# Patient Record
Sex: MALE | Race: ASIAN | NOT HISPANIC OR LATINO | URBAN - METROPOLITAN AREA
[De-identification: names, ages, dates, MRNs, and addresses within clinical notes are randomized per-mention and may not be internally consistent; named-entity substitution may affect disease eponyms.]

---

## 2020-08-14 ENCOUNTER — INPATIENT (INPATIENT)
Facility: HOSPITAL | Age: 55
LOS: 0 days | Discharge: HOME | End: 2020-08-15
Attending: INTERNAL MEDICINE | Admitting: INTERNAL MEDICINE
Payer: COMMERCIAL

## 2020-08-14 VITALS
TEMPERATURE: 98 F | RESPIRATION RATE: 16 BRPM | HEART RATE: 72 BPM | DIASTOLIC BLOOD PRESSURE: 89 MMHG | OXYGEN SATURATION: 100 % | SYSTOLIC BLOOD PRESSURE: 180 MMHG

## 2020-08-14 DIAGNOSIS — Z29.9 ENCOUNTER FOR PROPHYLACTIC MEASURES, UNSPECIFIED: ICD-10-CM

## 2020-08-14 DIAGNOSIS — D72.829 ELEVATED WHITE BLOOD CELL COUNT, UNSPECIFIED: ICD-10-CM

## 2020-08-14 DIAGNOSIS — E11.9 TYPE 2 DIABETES MELLITUS WITHOUT COMPLICATIONS: ICD-10-CM

## 2020-08-14 DIAGNOSIS — R07.9 CHEST PAIN, UNSPECIFIED: ICD-10-CM

## 2020-08-14 DIAGNOSIS — I10 ESSENTIAL (PRIMARY) HYPERTENSION: ICD-10-CM

## 2020-08-14 DIAGNOSIS — E78.00 PURE HYPERCHOLESTEROLEMIA, UNSPECIFIED: ICD-10-CM

## 2020-08-14 PROBLEM — Z00.00 ENCOUNTER FOR PREVENTIVE HEALTH EXAMINATION: Status: ACTIVE | Noted: 2020-08-14

## 2020-08-14 LAB
ALBUMIN SERPL ELPH-MCNC: 4.6 G/DL — SIGNIFICANT CHANGE UP (ref 3.3–5)
ALP SERPL-CCNC: 78 U/L — SIGNIFICANT CHANGE UP (ref 40–120)
ALT FLD-CCNC: 20 U/L — SIGNIFICANT CHANGE UP (ref 4–41)
ANION GAP SERPL CALC-SCNC: 16 MMO/L — HIGH (ref 7–14)
AST SERPL-CCNC: 20 U/L — SIGNIFICANT CHANGE UP (ref 4–40)
BASOPHILS # BLD AUTO: 0.03 K/UL — SIGNIFICANT CHANGE UP (ref 0–0.2)
BASOPHILS NFR BLD AUTO: 0.2 % — SIGNIFICANT CHANGE UP (ref 0–2)
BILIRUB SERPL-MCNC: 0.5 MG/DL — SIGNIFICANT CHANGE UP (ref 0.2–1.2)
BUN SERPL-MCNC: 16 MG/DL — SIGNIFICANT CHANGE UP (ref 7–23)
CALCIUM SERPL-MCNC: 9.7 MG/DL — SIGNIFICANT CHANGE UP (ref 8.4–10.5)
CHLORIDE SERPL-SCNC: 94 MMOL/L — LOW (ref 98–107)
CHOLEST SERPL-MCNC: 137 MG/DL — SIGNIFICANT CHANGE UP (ref 120–199)
CO2 SERPL-SCNC: 24 MMOL/L — SIGNIFICANT CHANGE UP (ref 22–31)
CREAT SERPL-MCNC: 0.86 MG/DL — SIGNIFICANT CHANGE UP (ref 0.5–1.3)
EOSINOPHIL # BLD AUTO: 0 K/UL — SIGNIFICANT CHANGE UP (ref 0–0.5)
EOSINOPHIL NFR BLD AUTO: 0 % — SIGNIFICANT CHANGE UP (ref 0–6)
GLUCOSE BLDC GLUCOMTR-MCNC: 104 MG/DL — HIGH (ref 70–99)
GLUCOSE BLDC GLUCOMTR-MCNC: 134 MG/DL — HIGH (ref 70–99)
GLUCOSE SERPL-MCNC: 164 MG/DL — HIGH (ref 70–99)
HBA1C BLD-MCNC: 6.1 % — HIGH (ref 4–5.6)
HCT VFR BLD CALC: 43.3 % — SIGNIFICANT CHANGE UP (ref 39–50)
HDLC SERPL-MCNC: 60 MG/DL — HIGH (ref 35–55)
HGB BLD-MCNC: 14 G/DL — SIGNIFICANT CHANGE UP (ref 13–17)
IMM GRANULOCYTES NFR BLD AUTO: 0.5 % — SIGNIFICANT CHANGE UP (ref 0–1.5)
LIDOCAIN IGE QN: 20.3 U/L — SIGNIFICANT CHANGE UP (ref 7–60)
LIPID PNL WITH DIRECT LDL SERPL: 75 MG/DL — SIGNIFICANT CHANGE UP
LYMPHOCYTES # BLD AUTO: 0.49 K/UL — LOW (ref 1–3.3)
LYMPHOCYTES # BLD AUTO: 3.4 % — LOW (ref 13–44)
MAGNESIUM SERPL-MCNC: 1.7 MG/DL — SIGNIFICANT CHANGE UP (ref 1.6–2.6)
MCHC RBC-ENTMCNC: 25.4 PG — LOW (ref 27–34)
MCHC RBC-ENTMCNC: 32.3 % — SIGNIFICANT CHANGE UP (ref 32–36)
MCV RBC AUTO: 78.4 FL — LOW (ref 80–100)
MONOCYTES # BLD AUTO: 0.47 K/UL — SIGNIFICANT CHANGE UP (ref 0–0.9)
MONOCYTES NFR BLD AUTO: 3.3 % — SIGNIFICANT CHANGE UP (ref 2–14)
NEUTROPHILS # BLD AUTO: 13.31 K/UL — HIGH (ref 1.8–7.4)
NEUTROPHILS NFR BLD AUTO: 92.6 % — HIGH (ref 43–77)
NRBC # FLD: 0 K/UL — SIGNIFICANT CHANGE UP (ref 0–0)
PHOSPHATE SERPL-MCNC: 2.8 MG/DL — SIGNIFICANT CHANGE UP (ref 2.5–4.5)
PLATELET # BLD AUTO: 178 K/UL — SIGNIFICANT CHANGE UP (ref 150–400)
PMV BLD: 11.2 FL — SIGNIFICANT CHANGE UP (ref 7–13)
POTASSIUM SERPL-MCNC: 4.1 MMOL/L — SIGNIFICANT CHANGE UP (ref 3.5–5.3)
POTASSIUM SERPL-SCNC: 4.1 MMOL/L — SIGNIFICANT CHANGE UP (ref 3.5–5.3)
PROT SERPL-MCNC: 7.4 G/DL — SIGNIFICANT CHANGE UP (ref 6–8.3)
RBC # BLD: 5.52 M/UL — SIGNIFICANT CHANGE UP (ref 4.2–5.8)
RBC # FLD: 12.8 % — SIGNIFICANT CHANGE UP (ref 10.3–14.5)
SARS-COV-2 RNA SPEC QL NAA+PROBE: SIGNIFICANT CHANGE UP
SODIUM SERPL-SCNC: 134 MMOL/L — LOW (ref 135–145)
TRIGL SERPL-MCNC: 29 MG/DL — SIGNIFICANT CHANGE UP (ref 10–149)
TROPONIN T, HIGH SENSITIVITY: 12 NG/L — SIGNIFICANT CHANGE UP (ref ?–14)
TROPONIN T, HIGH SENSITIVITY: 8 NG/L — SIGNIFICANT CHANGE UP (ref ?–14)
WBC # BLD: 14.37 K/UL — HIGH (ref 3.8–10.5)
WBC # FLD AUTO: 14.37 K/UL — HIGH (ref 3.8–10.5)

## 2020-08-14 PROCEDURE — 99223 1ST HOSP IP/OBS HIGH 75: CPT

## 2020-08-14 PROCEDURE — 71045 X-RAY EXAM CHEST 1 VIEW: CPT | Mod: 26

## 2020-08-14 PROCEDURE — 99285 EMERGENCY DEPT VISIT HI MDM: CPT

## 2020-08-14 RX ORDER — SIMVASTATIN 20 MG/1
1 TABLET, FILM COATED ORAL
Qty: 0 | Refills: 0 | DISCHARGE

## 2020-08-14 RX ORDER — MILK THISTLE 150 MG
0 CAPSULE ORAL
Qty: 0 | Refills: 0 | DISCHARGE

## 2020-08-14 RX ORDER — DEXTROSE 50 % IN WATER 50 %
15 SYRINGE (ML) INTRAVENOUS ONCE
Refills: 0 | Status: DISCONTINUED | OUTPATIENT
Start: 2020-08-14 | End: 2020-08-15

## 2020-08-14 RX ORDER — PANTOPRAZOLE SODIUM 20 MG/1
80 TABLET, DELAYED RELEASE ORAL ONCE
Refills: 0 | Status: COMPLETED | OUTPATIENT
Start: 2020-08-14 | End: 2020-08-14

## 2020-08-14 RX ORDER — DEXTROSE 50 % IN WATER 50 %
12.5 SYRINGE (ML) INTRAVENOUS ONCE
Refills: 0 | Status: DISCONTINUED | OUTPATIENT
Start: 2020-08-14 | End: 2020-08-15

## 2020-08-14 RX ORDER — FAMOTIDINE 10 MG/ML
20 INJECTION INTRAVENOUS EVERY 12 HOURS
Refills: 0 | Status: DISCONTINUED | OUTPATIENT
Start: 2020-08-14 | End: 2020-08-15

## 2020-08-14 RX ORDER — LIDOCAINE 4 G/100G
15 CREAM TOPICAL ONCE
Refills: 0 | Status: COMPLETED | OUTPATIENT
Start: 2020-08-14 | End: 2020-08-14

## 2020-08-14 RX ORDER — INSULIN LISPRO 100/ML
VIAL (ML) SUBCUTANEOUS
Refills: 0 | Status: DISCONTINUED | OUTPATIENT
Start: 2020-08-14 | End: 2020-08-15

## 2020-08-14 RX ORDER — ONDANSETRON 8 MG/1
4 TABLET, FILM COATED ORAL ONCE
Refills: 0 | Status: COMPLETED | OUTPATIENT
Start: 2020-08-14 | End: 2020-08-14

## 2020-08-14 RX ORDER — ONDANSETRON 8 MG/1
4 TABLET, FILM COATED ORAL EVERY 6 HOURS
Refills: 0 | Status: DISCONTINUED | OUTPATIENT
Start: 2020-08-14 | End: 2020-08-15

## 2020-08-14 RX ORDER — PANTOPRAZOLE SODIUM 20 MG/1
40 TABLET, DELAYED RELEASE ORAL
Refills: 0 | Status: DISCONTINUED | OUTPATIENT
Start: 2020-08-14 | End: 2020-08-15

## 2020-08-14 RX ORDER — ASPIRIN/CALCIUM CARB/MAGNESIUM 324 MG
162 TABLET ORAL DAILY
Refills: 0 | Status: DISCONTINUED | OUTPATIENT
Start: 2020-08-14 | End: 2020-08-15

## 2020-08-14 RX ORDER — GLUCAGON INJECTION, SOLUTION 0.5 MG/.1ML
1 INJECTION, SOLUTION SUBCUTANEOUS ONCE
Refills: 0 | Status: DISCONTINUED | OUTPATIENT
Start: 2020-08-14 | End: 2020-08-15

## 2020-08-14 RX ORDER — CHOLECALCIFEROL (VITAMIN D3) 125 MCG
0 CAPSULE ORAL
Qty: 0 | Refills: 0 | DISCHARGE

## 2020-08-14 RX ORDER — FAMOTIDINE 10 MG/ML
20 INJECTION INTRAVENOUS ONCE
Refills: 0 | Status: COMPLETED | OUTPATIENT
Start: 2020-08-14 | End: 2020-08-14

## 2020-08-14 RX ORDER — OMEGA-3 ACID ETHYL ESTERS 1 G
0 CAPSULE ORAL
Qty: 0 | Refills: 0 | DISCHARGE

## 2020-08-14 RX ORDER — SIMVASTATIN 20 MG/1
10 TABLET, FILM COATED ORAL AT BEDTIME
Refills: 0 | Status: DISCONTINUED | OUTPATIENT
Start: 2020-08-14 | End: 2020-08-15

## 2020-08-14 RX ORDER — SODIUM CHLORIDE 9 MG/ML
1000 INJECTION, SOLUTION INTRAVENOUS
Refills: 0 | Status: DISCONTINUED | OUTPATIENT
Start: 2020-08-14 | End: 2020-08-15

## 2020-08-14 RX ADMIN — FAMOTIDINE 20 MILLIGRAM(S): 10 INJECTION INTRAVENOUS at 12:43

## 2020-08-14 RX ADMIN — Medication 30 MILLILITER(S): at 12:43

## 2020-08-14 RX ADMIN — Medication 162 MILLIGRAM(S): at 16:23

## 2020-08-14 RX ADMIN — ONDANSETRON 4 MILLIGRAM(S): 8 TABLET, FILM COATED ORAL at 12:47

## 2020-08-14 RX ADMIN — SIMVASTATIN 10 MILLIGRAM(S): 20 TABLET, FILM COATED ORAL at 22:33

## 2020-08-14 RX ADMIN — FAMOTIDINE 20 MILLIGRAM(S): 10 INJECTION INTRAVENOUS at 18:14

## 2020-08-14 RX ADMIN — PANTOPRAZOLE SODIUM 80 MILLIGRAM(S): 20 TABLET, DELAYED RELEASE ORAL at 12:43

## 2020-08-14 RX ADMIN — LIDOCAINE 15 MILLILITER(S): 4 CREAM TOPICAL at 12:47

## 2020-08-14 NOTE — ED PROVIDER NOTE - ATTENDING CONTRIBUTION TO CARE
Gong: I have seen and examined the patient face to face, have reviewed and addended the HPI, PE and a/p as necessary.     56 yo M with HTN PUD, a/w chest pain, nausea and vomiting yesterday after ETOH intake.  Pt reports drinking beers and a shot of vodka yesterday developed multiple episodes of vomiting, described as dark.  Reports occasional drinking.  Reports associated chest pain since described as diffuse burning and acidit taste.  Denies exertional chest pain, exertional shortness of breath, fevers, chills abdominal pain lightheadedness, diaphoresis, LE swelling.      GEN - NAD; well appearing; A+O x3; non-toxic appearing; CARD -s1s2, RRR, no M,G,R; PULM - CTA b/l, symmetric breath sounds; ABD -  +BS, ND, NT, soft, no guarding, no rebound, no masses; BACK - no CVA tenderness, Normal  spine; EXT - symmetric pulses, 2+ dp, capillary refill < 2 seconds, no cyanosis, no edema; NEURO - no focal neuro deficits, no slurred speech    EKG NSR 69, normal axis, no ST-changes     56 yo M with HTN PUD, a/w chest pain, nausea and vomiting yesterday after ETOH intake possibly alcoholic gastritis vs acute on chronic PUD vs less likely ACS given history, with nl EKG.  Symptomatic care, follow up cbc, cmp, trops, re-eval

## 2020-08-14 NOTE — H&P ADULT - NSICDXPASTMEDICALHX_GEN_ALL_CORE_FT
PAST MEDICAL HISTORY:  Diabetes Mellitus borderline --just diagnosed 2 weeks ago    Hypercholesterolemia     Hypertension

## 2020-08-14 NOTE — ED ADULT TRIAGE NOTE - CHIEF COMPLAINT QUOTE
pt amb to triage c/o CP since last night non radiating, described as burning associated w/ N/V, pepto @ home w/ no relief, burping on presentation, denies past cardiac history, states ETOH last night, Hx ulcer

## 2020-08-14 NOTE — CONSULT NOTE ADULT - SUBJECTIVE AND OBJECTIVE BOX
DATE OF SERVICE:Patient was seen,examined and evaluated on-08/14/2020    CHIEF COMPLAINT:Chest pain    HPI:55F w/PMH of HTN, HLD, T2DM, and PUD/GERD presenting with chest pain and nausea with vomitting since last night. Pt reports drinking a few beers and a shot of vodka yesterday around 7PM. When he went to sleep he felt reflux symptoms and forced himself to vomit.   Since then he reports burning chest pain, intermittent nausea, no further emesis.   Reports having EGD with Dr. Hunt, but unsure when.   Reports having normal stress test, echo, carotid US within the last year w/Dr Josefa Diaz     In ED, pt given IV Protonix, IV Pepcid, Viscous lidocaine, Maalox suspension, IV Zofran which relieved some symptoms. (14 Aug 2020 15:30)      PAST MEDICAL & SURGICAL HISTORY:  Hypertension  Diabetes Mellitus: borderline --just diagnosed 2 weeks ago  Hypercholesterolemia  No significant past surgical history      MEDICATIONS  (STANDING):  aspirin enteric coated 162 milliGRAM(s) Oral daily  dextrose 5%. 1000 milliLiter(s) (50 mL/Hr) IV Continuous <Continuous>  dextrose 50% Injectable 12.5 Gram(s) IV Push once  famotidine Injectable 20 milliGRAM(s) IV Push every 12 hours  insulin lispro (HumaLOG) corrective regimen sliding scale   SubCutaneous Before meals and at bedtime  pantoprazole    Tablet 40 milliGRAM(s) Oral before breakfast  simvastatin 10 milliGRAM(s) Oral at bedtime    MEDICATIONS  (PRN):  aluminum hydroxide/magnesium hydroxide/simethicone Suspension 30 milliLiter(s) Oral every 6 hours PRN Dyspepsia  dextrose 40% Gel 15 Gram(s) Oral once PRN Blood Glucose LESS THAN 70 milliGRAM(s)/deciliter  glucagon  Injectable 1 milliGRAM(s) IntraMuscular once PRN Glucose LESS THAN 70 milligrams/deciliter  ondansetron Injectable 4 milliGRAM(s) IV Push every 6 hours PRN Nausea and/or Vomiting      FAMILY HISTORY:  Family history of lung cancer  No family history of premature coronary artery disease or sudden cardiac death    SOCIAL HISTORY:  Smoking-Non Smoker  Alcohol-Occaisonal binge  Ilicit Drug use-Denies    REVIEW OF SYSTEMS:  Constitutional: [ ] fever, [ ]weight loss, [ ]fatigue Activity [ ] Bedbound,[ ] Ambulates [ ] Unassisted[ ] Cane/Walker [ ] Assistence.  Eyes: [ ] visual changes  Respiratory: [ ]shortness of breath;  [ ] cough, [ ]wheezing, [ ]chills, [ ]hemoptysis  Cardiovascular: [ ] chest pain, [ ]palpitations, [ ]dizziness,  [ ]leg swelling[ ]orthopnea [ ]PND  Gastrointestinal: [ ] abdominal pain, [ ]nausea, [ ]vomiting,  [ ]diarrhea,[ ]constipation  Genitourinary: [ ] dysuria, [ ] hematuria  Neurologic: [ ] headaches [ ] tremors[ ] weakness  Skin: [ ] itching, [ ]burning, [ ] rashes  Endocrine: [ ] heat or cold intolerance  Musculoskeletal: [ ] joint pain or swelling; [ ] muscle, back, or extremity pain  Psychiatric: [ ] depression, [ ]anxiety, [ ]mood swings, or [ ]difficulty sleeping  Hematologic: [ ] easy bruising, [ ] bleeding gums       [ x] All others negative	  [ ] Unable to obtain    Vital Signs Last 24 Hrs    T(C): 36.8 (08-14-20 @ 13:36), Max: 36.8 (08-14-20 @ 13:36)  	T(F): 98.3 (08-14-20 @ 13:36), Max: 98.3 (08-14-20 @ 13:36)  	HR: 70 (08-14-20 @ 13:36) (70 - 72)  	BP: 178/99 (08-14-20 @ 13:36) (178/99 - 180/89)  	RR: 16 (08-14-20 @ 13:36) (16 - 16)  	SpO2: 100% (08-14-20 @ 13:36) (100% - 100%)      PHYSICAL EXAM:  General: No acute distress BMI-  HEENT: EOMI, PERRL[ ] Icteric  Neck: Supple, No JVD  Lungs: Equal air entry bilaterally; [ ] Rales [ ] Rhonchi [ ] Wheezing  Heart: Regular rate and rhythm;[x ] Murmurs-  2 /6 [x ] Systolic [ ] Diastolic [ ] Radiation,No rubs, or gallops  Abdomen: Nontender, bowel sounds present  Extremities: No clubbing, cyanosis, or edema[ ] Calf tenderness  Nervous system:  Alert & Oriented X3, no focal deficits  Psychiatric: Normal affect  Skin: No rashes or lesions      LABS:  08-14    134<L>  |  94<L>  |  16  ----------------------------<  164<H>  4.1   |  24  |  0.86    Ca    9.7      14 Aug 2020 12:20  Phos  2.8     08-14  Mg     1.7     08-14    TPro  7.4  /  Alb  4.6  /  TBili  0.5  /  DBili  x   /  AST  20  /  ALT  20  /  AlkPhos  78  08-14    Creatinine Trend: 0.86<--                        14.0   14.37 )-----------( 178      ( 14 Aug 2020 12:20 )             43.3         Lipid Panel: Cholesterol, Serum 137  Direct LDL 75  HDL Cholesterol, Serum 60  Triglycerides, Serum 29    Troponin T, High Sensitivity (08.14.20 @ 14:00)    Troponin T, High Sensitivity: 12 <---8  ng/L      RADIOLOGY:CXR-IMPRESSION:  	No subdiaphragmatic free air or abnormally dilated bowel loops in the visualized upper abdomen.  	Clear lungs. No pleural effusions pneumothorax or pneumomediastinum.  	Cardiac and mediastinal silhouettes within normal limits.  	Trachea midline    Unremarkable osseous structures.    ECG [my interpretation]:Sinus Rhythm at 69 BPM No ST T wave abnormalities    TELEMETRY:Sinus Rhythm

## 2020-08-14 NOTE — ED ADULT NURSE NOTE - OBJECTIVE STATEMENT
Pt amb to triage c/o CP since last night non radiating, described as burning associated w/ N/V, pepto @ home w/ no relief, burping on presentation, denies past cardiac history, states ETOH last night,  Patient to room 27 alert and oriented times four. Pt evaluated by MD for above complaints. IV lock placed to right antecubital 20 gauge and labs drawn and sent. Waiting for results and disposition.  TOSHIA Gay

## 2020-08-14 NOTE — ED PROVIDER NOTE - OBJECTIVE STATEMENT
56 yo M w/ PMHx HTN, PUD presents with chest pain, belching, n/v since last night. Pt states he had multiple beers and a shot of Ciroc yesterday evening. He subsequently had multiple episodes of vomiting. He states contents was dark. He has had this occur before in setting of binge drinking. Denies drinking every day. Since then, pt has had diffuse burning chest pain as well as belching with a subsequent acidic taste in his mouth. Denies CP, SOB, fevers, chills, abdominal pain, lightheadedness, diaphoresis.

## 2020-08-14 NOTE — ED PROVIDER NOTE - PROGRESS NOTE DETAILS
Gave Gi cocktail with some improvement but continues to have chest pain. EKG NSR. Repeat trop 8>12. Will admit to tele doc

## 2020-08-14 NOTE — CONSULT NOTE ADULT - ASSESSMENT
55F w/PMH of HTN, HLD, T2DM, and PUD/GERD presenting with chest pain and n/v since last night. Admitted to telemetry     Problem/Plan - 1:  ·  Problem: Chest pain.  Plan:Concern for ACS  -ECG normal with negative troponins  -Likely GI related  -PPI   -Observe 24 hrs   -TTE possible stress echo      Problem/Plan - 2:  ·  Problem: Hypertension.  Plan: Hypertensive in ED, likely due to pain. Not on meds outpt   -Repeat BP, can give Hydral if needed for BP >180.   -Monitor BP    Problem/Plan - 3:  ·  Problem: Diabetes Mellitus.  Plan: f/u A1c   LDSS and FS.   -POCT  Blood Glucose (08.14.20 @ 17:55)    POCT Blood Glucose.: 134 mg/dL        Problem/Plan - 4:  ·  Problem: Hypercholesterolemia.  Plan: f/u lipid panel   C/w simvastatin.       Problem/Plan - 5:  ·  Problem: Leukocytosis.  Plan:No overt signs of infection  -Afebrile  -Repeat WBC in AM      Problem/Plan - 6:  Problem: Prophylactic measure. Plan: OOB, ambulate   CCD, DASH/no caffeine.

## 2020-08-14 NOTE — H&P ADULT - HISTORY OF PRESENT ILLNESS
55F w/PMH of HTN, HLD, DMII, and PUD presenting with chest pain and n/v since last night. 55F w/PMH of HTN, HLD, DMII, and PUD/GERD presenting with chest pain and n/v since last night. Pt reports drinking a few beers and a shot of vodka yesterday around 7PM. When he went to sleep he felt reflux symptoms and forced himself to vomit. Since then he reports burning chest pain, intermittent nausea, no further emesis.   Reports having EGD with Dr. Hunt, but unsure when. Reports having normal stress test, echo, carotid US within the last year w/Dr Josefa Diaz     In ED, pt given IV Protonix, IV Pepcid, Viscous lidocaine, Maalox suspension, IV Zofran which relieved some symptoms.

## 2020-08-14 NOTE — H&P ADULT - NSHPSOCIALHISTORY_GEN_ALL_CORE
Lives with family, works in fashion industry  Denies smoking or illicit drug use  Reports binge drinking once in a while

## 2020-08-14 NOTE — H&P ADULT - PROBLEM SELECTOR PLAN 2
Likely reactive in setting of emesis. NO signs of infection  -Monitor CBC  -Hold off on abx at this time

## 2020-08-14 NOTE — H&P ADULT - ASSESSMENT
55F w/PMH of HTN, HLD, DMII, and PUD/GERD presenting with chest pain and n/v since last night admitted to cardiac telemetry for ACS workup.

## 2020-08-14 NOTE — ED PROVIDER NOTE - CLINICAL SUMMARY MEDICAL DECISION MAKING FREE TEXT BOX
56 yo M w/ PMHx HTN, PUD presents with chest pain, belching, n/v since last night after en episode of binge drinking. Will obtain cbc, cmp, trop, ekg, CXR to r/o Carmen Govea tear. Will also GI cocktail, protonix 80 mg IV x1 given hx of PUD and dark vomitus, suspect gastritis vs PUD vs ACS.

## 2020-08-14 NOTE — H&P ADULT - PROBLEM SELECTOR PLAN 3
Hypertensive in ED, likely due to pain. Not on meds outpt   -Repeat BP, can give Hydral if needed for BP >180

## 2020-08-14 NOTE — H&P ADULT - NSHPLABSRESULTS_GEN_ALL_CORE
.  LABS:                         14.0   14.37 )-----------( 178      ( 14 Aug 2020 12:20 )             43.3     08-14    134<L>  |  94<L>  |  16  ----------------------------<  164<H>  4.1   |  24  |  0.86    Ca    9.7      14 Aug 2020 12:20  Phos  2.8     08-14  Mg     1.7     08-14    TPro  7.4  /  Alb  4.6  /  TBili  0.5  /  DBili  x   /  AST  20  /  ALT  20  /  AlkPhos  78  08-14                  RADIOLOGY, EKG & ADDITIONAL TESTS: Reviewed.   EKG:       < from: Xray Chest 1 View AP/PA (08.14.20 @ 13:09) >  IMPRESSION:  No subdiaphragmatic free air or abnormally dilated bowel loops in the visualized upper abdomen.  Clear lungs. No pleural effusions pneumothorax or pneumomediastinum.  Cardiac and mediastinal silhouettes within normal limits.  Trachea midline.  Unremarkable osseous structures.  DREW MONTANA M.D., ATTENDING RADIOLOGIST  This document has been electronically signed. Aug 14 2020  2:13PM .  LABS:                         14.0   14.37 )-----------( 178      ( 14 Aug 2020 12:20 )             43.3     08-14    134<L>  |  94<L>  |  16  ----------------------------<  164<H>  4.1   |  24  |  0.86    Ca    9.7      14 Aug 2020 12:20  Phos  2.8     08-14  Mg     1.7     08-14    TPro  7.4  /  Alb  4.6  /  TBili  0.5  /  DBili  x   /  AST  20  /  ALT  20  /  AlkPhos  78  08-14                  RADIOLOGY, EKG & ADDITIONAL TESTS: Reviewed.   EKG: NSR at 69, normal axis, no ST-changes on my review       < from: Xray Chest 1 View AP/PA (08.14.20 @ 13:09) >  IMPRESSION:  No subdiaphragmatic free air or abnormally dilated bowel loops in the visualized upper abdomen.  Clear lungs. No pleural effusions pneumothorax or pneumomediastinum.  Cardiac and mediastinal silhouettes within normal limits.  Trachea midline.  Unremarkable osseous structures.  DREW MONTANA M.D., ATTENDING RADIOLOGIST  This document has been electronically signed. Aug 14 2020  2:13PM

## 2020-08-14 NOTE — CONSULT NOTE ADULT - ATTENDING COMMENTS
Patient was seen and examined by me on 08/14/2020,interim events noted,labs and radiology studies reviewed.  Tu Reynolds MD,FACC.  [address] [place] [address].  [facility].  [phone]

## 2020-08-14 NOTE — H&P ADULT - NSHPPHYSICALEXAM_GEN_ALL_CORE
.  VITAL SIGNS:  T(C): 36.8 (08-14-20 @ 13:36), Max: 36.8 (08-14-20 @ 13:36)  T(F): 98.3 (08-14-20 @ 13:36), Max: 98.3 (08-14-20 @ 13:36)  HR: 70 (08-14-20 @ 13:36) (70 - 72)  BP: 178/99 (08-14-20 @ 13:36) (178/99 - 180/89)  BP(mean): --  RR: 16 (08-14-20 @ 13:36) (16 - 16)  SpO2: 100% (08-14-20 @ 13:36) (100% - 100%)  Wt(kg): --    PHYSICAL EXAM:    Constitutional: WDWN resting comfortably in bed; NAD  Head: NC/AT  Eyes: PERRL, EOMI, clear conjunctiva  ENT: no nasal discharge; uvula midline, no oropharyngeal erythema or exudates; MMM  Neck: supple; no JVD or thyromegaly  Respiratory: CTA B/L; no W/R/R, no retractions  Cardiac: +S1/S2; RRR; no M/R/G; PMI non-displaced  Gastrointestinal: soft, NT/ND; no rebound or guarding; +BSx4  Genitourinary: normal external genitalia  Back: spine midline, no bony tenderness or step-offs; no CVAT B/L  Extremities: WWP, no clubbing or cyanosis; no peripheral edema  Musculoskeletal: NROM x4; no joint swelling, tenderness or erythema  Vascular: 2+ radial, femoral, DP/PT pulses B/L  Dermatologic: skin warm, dry and intact; no rashes, wounds, or scars  Lymphatic: no submandibular or cervical LAD  Neurologic: AAOx3; CNII-XII grossly intact; no focal deficits  Psychiatric: affect and characteristics of appearance, verbalizations, behaviors are appropriate .  VITAL SIGNS:  T(C): 36.8 (08-14-20 @ 13:36), Max: 36.8 (08-14-20 @ 13:36)  T(F): 98.3 (08-14-20 @ 13:36), Max: 98.3 (08-14-20 @ 13:36)  HR: 70 (08-14-20 @ 13:36) (70 - 72)  BP: 178/99 (08-14-20 @ 13:36) (178/99 - 180/89)  BP(mean): --  RR: 16 (08-14-20 @ 13:36) (16 - 16)  SpO2: 100% (08-14-20 @ 13:36) (100% - 100%)  Wt(kg): --    PHYSICAL EXAM:    Constitutional: WDWN resting comfortably in stretcher; NAD  Head: NC/AT  Eyes: PERRL, EOMI, clear conjunctiva  ENT: no nasal discharge; MMM  Neck: supple  Respiratory: CTA B/L; no W/R/R, no retractions  Cardiac: +S1/S2; RRR; no M/R/G  Gastrointestinal: soft, NT/ND; no rebound or guarding; +BSx4  Extremities: WWP, no clubbing or cyanosis; no peripheral edema  Musculoskeletal: NROM x4; no joint swelling, tenderness or erythema  Dermatologic: skin warm, dry and intact; no rashes, wounds, or scars  Neurologic: AAOx3; CNII-XII grossly intact; no focal deficits  Psychiatric: affect and characteristics of appearance, verbalizations, behaviors are appropriate

## 2020-08-15 ENCOUNTER — TRANSCRIPTION ENCOUNTER (OUTPATIENT)
Age: 55
End: 2020-08-15

## 2020-08-15 VITALS
TEMPERATURE: 99 F | HEART RATE: 98 BPM | DIASTOLIC BLOOD PRESSURE: 90 MMHG | SYSTOLIC BLOOD PRESSURE: 138 MMHG | RESPIRATION RATE: 17 BRPM | OXYGEN SATURATION: 98 %

## 2020-08-15 LAB
ALBUMIN SERPL ELPH-MCNC: 4.4 G/DL — SIGNIFICANT CHANGE UP (ref 3.3–5)
ALP SERPL-CCNC: 71 U/L — SIGNIFICANT CHANGE UP (ref 40–120)
ALT FLD-CCNC: 13 U/L — SIGNIFICANT CHANGE UP (ref 4–41)
ANION GAP SERPL CALC-SCNC: 14 MMO/L — SIGNIFICANT CHANGE UP (ref 7–14)
AST SERPL-CCNC: 17 U/L — SIGNIFICANT CHANGE UP (ref 4–40)
BASOPHILS # BLD AUTO: 0.03 K/UL — SIGNIFICANT CHANGE UP (ref 0–0.2)
BASOPHILS NFR BLD AUTO: 0.2 % — SIGNIFICANT CHANGE UP (ref 0–2)
BILIRUB SERPL-MCNC: 0.8 MG/DL — SIGNIFICANT CHANGE UP (ref 0.2–1.2)
BUN SERPL-MCNC: 13 MG/DL — SIGNIFICANT CHANGE UP (ref 7–23)
CALCIUM SERPL-MCNC: 9.6 MG/DL — SIGNIFICANT CHANGE UP (ref 8.4–10.5)
CHLORIDE SERPL-SCNC: 102 MMOL/L — SIGNIFICANT CHANGE UP (ref 98–107)
CO2 SERPL-SCNC: 21 MMOL/L — LOW (ref 22–31)
CREAT SERPL-MCNC: 0.97 MG/DL — SIGNIFICANT CHANGE UP (ref 0.5–1.3)
EOSINOPHIL # BLD AUTO: 0.05 K/UL — SIGNIFICANT CHANGE UP (ref 0–0.5)
EOSINOPHIL NFR BLD AUTO: 0.4 % — SIGNIFICANT CHANGE UP (ref 0–6)
GLUCOSE BLDC GLUCOMTR-MCNC: 132 MG/DL — HIGH (ref 70–99)
GLUCOSE SERPL-MCNC: 117 MG/DL — HIGH (ref 70–99)
HCT VFR BLD CALC: 44.5 % — SIGNIFICANT CHANGE UP (ref 39–50)
HCV AB S/CO SERPL IA: 0.12 S/CO — SIGNIFICANT CHANGE UP (ref 0–0.99)
HCV AB SERPL-IMP: SIGNIFICANT CHANGE UP
HGB BLD-MCNC: 14 G/DL — SIGNIFICANT CHANGE UP (ref 13–17)
IMM GRANULOCYTES NFR BLD AUTO: 0.4 % — SIGNIFICANT CHANGE UP (ref 0–1.5)
LYMPHOCYTES # BLD AUTO: 0.99 K/UL — LOW (ref 1–3.3)
LYMPHOCYTES # BLD AUTO: 8 % — LOW (ref 13–44)
MAGNESIUM SERPL-MCNC: 2.3 MG/DL — SIGNIFICANT CHANGE UP (ref 1.6–2.6)
MCHC RBC-ENTMCNC: 24.7 PG — LOW (ref 27–34)
MCHC RBC-ENTMCNC: 31.5 % — LOW (ref 32–36)
MCV RBC AUTO: 78.6 FL — LOW (ref 80–100)
MONOCYTES # BLD AUTO: 1.02 K/UL — HIGH (ref 0–0.9)
MONOCYTES NFR BLD AUTO: 8.2 % — SIGNIFICANT CHANGE UP (ref 2–14)
NEUTROPHILS # BLD AUTO: 10.24 K/UL — HIGH (ref 1.8–7.4)
NEUTROPHILS NFR BLD AUTO: 82.8 % — HIGH (ref 43–77)
NRBC # FLD: 0 K/UL — SIGNIFICANT CHANGE UP (ref 0–0)
PHOSPHATE SERPL-MCNC: 2.6 MG/DL — SIGNIFICANT CHANGE UP (ref 2.5–4.5)
PLATELET # BLD AUTO: 201 K/UL — SIGNIFICANT CHANGE UP (ref 150–400)
PMV BLD: 11 FL — SIGNIFICANT CHANGE UP (ref 7–13)
POTASSIUM SERPL-MCNC: 4.3 MMOL/L — SIGNIFICANT CHANGE UP (ref 3.5–5.3)
POTASSIUM SERPL-SCNC: 4.3 MMOL/L — SIGNIFICANT CHANGE UP (ref 3.5–5.3)
PROT SERPL-MCNC: 7.3 G/DL — SIGNIFICANT CHANGE UP (ref 6–8.3)
RBC # BLD: 5.66 M/UL — SIGNIFICANT CHANGE UP (ref 4.2–5.8)
RBC # FLD: 13 % — SIGNIFICANT CHANGE UP (ref 10.3–14.5)
SODIUM SERPL-SCNC: 137 MMOL/L — SIGNIFICANT CHANGE UP (ref 135–145)
WBC # BLD: 12.38 K/UL — HIGH (ref 3.8–10.5)
WBC # FLD AUTO: 12.38 K/UL — HIGH (ref 3.8–10.5)

## 2020-08-15 RX ORDER — FEXOFENADINE HCL 30 MG
0 TABLET ORAL
Qty: 0 | Refills: 0 | DISCHARGE

## 2020-08-15 RX ORDER — PANTOPRAZOLE SODIUM 20 MG/1
1 TABLET, DELAYED RELEASE ORAL
Qty: 30 | Refills: 0
Start: 2020-08-15 | End: 2020-09-13

## 2020-08-15 RX ORDER — ASPIRIN/CALCIUM CARB/MAGNESIUM 324 MG
2 TABLET ORAL
Qty: 60 | Refills: 0
Start: 2020-08-15 | End: 2020-09-13

## 2020-08-15 RX ADMIN — FAMOTIDINE 20 MILLIGRAM(S): 10 INJECTION INTRAVENOUS at 06:03

## 2020-08-15 NOTE — DISCHARGE NOTE PROVIDER - HOSPITAL COURSE
55F w/PMH of HTN, HLD, DMII, and PUD/GERD presenting with chest pain and n/v since last night admitted to cardiac telemetry for ACS workup.           Hospital course:    Chest pain - HsT neg x2, EKG: NSR, no ST changes. Cath 2010 Normal coronaries. Patient had Stress test 5 months ago-negative. More likely GI cause due to episode of forced emesis, follows with Dr. Hunt. Symptoms improved with GI cocktail , c/w PPI upon DC         Leukocytosis - Likely reactive in setting of emesis. NO signs of infection. Resolved.        Diabetes Mellitus - A1C 6.1 55M w/PMH of HTN, HLD, DMII, and PUD/GERD presenting with chest pain and n/v since last night admitted to cardiac telemetry for ACS workup.           Hospital course:    Chest pain - HsT neg x2, EKG: NSR, no ST changes. Cath 2010 Normal coronaries. Patient had Stress test 5 months ago-negative. More likely GI cause due to episode of forced emesis, follows with Dr. Hunt. Symptoms improved with GI cocktail , c/w PPI and Maalox upon DC         Leukocytosis - Likely reactive in setting of emesis. NO signs of infection. Resolved.        Case discussed with Dr. Reynolds, pt medically stable for discharge home.

## 2020-08-15 NOTE — PROVIDER CONTACT NOTE (OTHER) - SITUATION
Patient AOx4, ambulating to bathroom. Patient denies chest pain or SOB, no distress noted. Patient vital signs stable besides HR being 132. Patient AOx4, ambulating to bathroom. Tele tech notified RN that patient was tachy to 132 on the Tele Monitor. Patient denies chest pain or SOB, no distress noted.

## 2020-08-15 NOTE — DISCHARGE NOTE PROVIDER - NSDCMRMEDTOKEN_GEN_ALL_CORE_FT
Allegra:   Fish Oil:   simvastatin 10 mg oral tablet: 1 tab(s) orally once a day (at bedtime)  Turmeric:   Vitamin D3: aluminum hydroxide-magnesium hydroxide 200 mg-200 mg/5 mL oral suspension: 30 milliliter(s) orally every 6 hours, As needed, Dyspepsia  aspirin 81 mg oral delayed release tablet: 2 tab(s) orally once a day  Fish Oil:   pantoprazole 40 mg oral delayed release tablet: 1 tab(s) orally once a day (before a meal)  simvastatin 10 mg oral tablet: 1 tab(s) orally once a day (at bedtime)  Turmeric:   Vitamin D3:

## 2020-08-15 NOTE — DISCHARGE NOTE NURSING/CASE MANAGEMENT/SOCIAL WORK - PATIENT PORTAL LINK FT
You can access the FollowMyHealth Patient Portal offered by [facility] by registering at the following website: [email or web address]. By joining [employer]’s FollowMyHealth portal, you will also be able to view your health information using other applications (apps) compatible with our system.

## 2020-08-15 NOTE — PROGRESS NOTE ADULT - SUBJECTIVE AND OBJECTIVE BOX
DATE OF SERVICE:Patient was seen and examined :08/15/2020    PRESENTING CC:Chest pain    SUBJ: 55F w/PMH of HTN, HLD, T2DM, and PUD/GERD presenting with chest pain and nausea with vomitting       PMH -reviewed admission note, no change since admission  Heart failure: acute [ ] chronic [ ] acute or chronic [ ] diastolic [ ] systolic [ ] combined systolic and diastolic[ ]  YESICA: ATN[ ] renal medullary necrosis [ ] CKD I [ ]CKDII [ ]CKD III [ ]CKD IV [ ]CKD V [ ]Other pathological lesions [ ]    MEDICATIONS  (STANDING):  aspirin enteric coated 162 milliGRAM(s) Oral daily  dextrose 5%. 1000 milliLiter(s) (50 mL/Hr) IV Continuous <Continuous>  dextrose 50% Injectable 12.5 Gram(s) IV Push once  famotidine Injectable 20 milliGRAM(s) IV Push every 12 hours  insulin lispro (HumaLOG) corrective regimen sliding scale   SubCutaneous Before meals and at bedtime  pantoprazole    Tablet 40 milliGRAM(s) Oral before breakfast  simvastatin 10 milliGRAM(s) Oral at bedtime    MEDICATIONS  (PRN):  aluminum hydroxide/magnesium hydroxide/simethicone Suspension 30 milliLiter(s) Oral every 6 hours PRN Dyspepsia  dextrose 40% Gel 15 Gram(s) Oral once PRN Blood Glucose LESS THAN 70 milliGRAM(s)/deciliter  glucagon  Injectable 1 milliGRAM(s) IntraMuscular once PRN Glucose LESS THAN 70 milligrams/deciliter  ondansetron Injectable 4 milliGRAM(s) IV Push every 6 hours PRN Nausea and/or Vomiting              REVIEW OF SYSTEMS:  Constitutional: [ ] fever, [ ]weight loss,  [ ]fatigue  Eyes: [ ] visual changes  Respiratory: [ ]shortness of breath;  [ ] cough, [ ]wheezing, [ ]chills, [ ]hemoptysis  Cardiovascular: [ ] chest pain, [ ]palpitations, [ ]dizziness,  [ ]leg swelling[ ]orthopnea[ ]PND  Gastrointestinal: [ ] abdominal pain, [ ]nausea, [ ]vomiting,  [ ]diarrhea   Genitourinary: [ ] dysuria, [ ] hematuria  Neurologic: [ ] headaches [ ] tremors[ ]weakness  Skin: [ ] itching, [ ]burning, [ ] rashes  Endocrine: [ ] heat or cold intolerance  Musculoskeletal: [ ] joint pain or swelling; [ ] muscle, back, or extremity pain  Psychiatric: [ ] depression, [ ]anxiety, [ ]mood swings, or [ ]difficulty sleeping  Hematologic: [ ] easy bruising, [ ] bleeding gums    [x] All remaining systems negative except as per above.   [ ]Unable to obtain.    Vital Signs Last 24 Hrs  T(C): 37.4 (15 Aug 2020 07:51), Max: 37.4 (15 Aug 2020 07:51)  T(F): 99.3 (15 Aug 2020 07:51), Max: 99.3 (15 Aug 2020 07:51)  HR: 98 (15 Aug 2020 07:51) (70 - 132)  BP: 138/90 (15 Aug 2020 07:51) (138/90 - 180/89)  BP(mean): --  RR: 17 (15 Aug 2020 07:51) (16 - 18)  SpO2: 98% (15 Aug 2020 07:51) (98% - 100%)  I&O's Summary      PHYSICAL EXAM:  General: No acute distress BMI-  HEENT: EOMI, PERRL  Neck: Supple, [ ] JVD  Lungs: Equal air entry bilaterally; [ ] rales [ ] wheezing [ ] rhonchi  Heart: Regular rate and rhythm; [ ] murmur   /6 [ ] systolic [ ] diastolic [ ] radiation[ ] rubs [ ]  gallops  Abdomen: Nontender, bowel sounds present  Extremities: No clubbing, cyanosis, [ ] edema  Nervous system:  Alert & Oriented X3, no focal deficits  Psychiatric: Normal affect  Skin: No rashes or lesions    LABS:  08-15    137  |  102  |  13  ----------------------------<  117<H>  4.3   |  21<L>  |  0.97    Ca    9.6      15 Aug 2020 06:40  Phos  2.6     08-15< from: Cardiac Cath Lab (04.30.10 @ 17:12) >  Study date: 04/30/2010Ventricles: There were no left ventricular global or regional wall motion  abnormalities.  Coronary vessels: The coronary circulation is right dominant.  LM:      LM: Normal.  LAD:      LAD: Normal.  CX:      Circumflex: Normal.  RCA:      RCA: Normal.    < end of copied text >    Mg     2.3     08-15    TPro  7.3  /  Alb  4.4  /  TBili  0.8  /  DBili  x   /  AST  17  /  ALT  13  /  AlkPhos  71  08-15    Creatinine Trend: 0.97<--, 0.86<--                        14.0   12.38 )-----------( 201      ( 15 Aug 2020 06:40 )             44.5         CATHETERIZATION:Study date: 04/30/2010  Ventricles: There were no left ventricular global or regional wall motion abnormalities.  Coronary vessels: The coronary circulation is right dominant.  LM:      LM: Normal.  LAD:      LAD: Normal.  CX:      Circumflex: Normal.  RCA:      RCA: Normal        IMPRESSION AND PLAN:      55F w/PMH of HTN, HLD, T2DM, and PUD/GERD presenting with chest pain and n/v since last night. Admitted to telemetry     Problem/Plan - 1:  ·  Problem: Chest pain.  Plan:Concern for ACS  -ECG normal with negative troponins  -Likely GI related  -PPI   -Observe 24 hrs   -Patient had Stress test 5 months ago-negative  -Cath 2010 Normal coronaries  Discharge Home today    Problem/Plan - 2:  ·  Problem: Hypertension.  Plan: Hypertensive in ED, likely due to pain. Not on meds outpt   -BP stable  Problem/Plan - 3:  ·  Problem: Diabetes Mellitus.  Plan: A1C with Estimated Average Glucose (08.14.20 @ 12:28)    A1C with Estimated Average Glucose: 6 %  -POCT  Blood Glucose (08.15.20 @ 06:08)    POCT Blood Glucose.: 132 <---104 g/dL        Problem/Plan - 4:  ·  Problem: Hypercholesterolemia.  Plan: Lipid Profile (08.14.20 @ 12:20)    Cholesterol, Serum: 137 mg/dL    Triglycerides, Serum: 29 mg/dL    HDL Cholesterol, Serum: 60 mg/dL    Direct LDL:114 mg/dL    C/w simvastatin.       Problem/Plan - 5:  ·  Problem: Leukocytosis.  Plan:No overt signs of infection  -Afebrile  -Repeat WBC  12  -Resolved      Problem/Plan - 6:  Problem: Prophylactic measure. Plan: OOB, ambulate   CCD, DASH/no caffeine.

## 2020-08-15 NOTE — PROVIDER CONTACT NOTE (OTHER) - RECOMMENDATIONS
Tele Pa LIYA SAEED notified and made aware. Pa recommended to monitor patient, no interventions at this time.

## 2020-08-15 NOTE — PROGRESS NOTE ADULT - ATTENDING COMMENTS
Patient was seen and examined by me on08/15/2020,interim events noted,labs and radiology studies reviewed.  Tu Reynolds MD,FACC.  [address] [place] [address].  [id number].  [phone]

## 2020-08-15 NOTE — PROVIDER CONTACT NOTE (OTHER) - ASSESSMENT
Patient AOx4, ambulating to bathroom. Patient denies chest pain or SOB, no distress noted. Patient Blood pressure 147/87,  all other vital sings stable. Tele tech notified RN that patient was tachy to 132 on the Telemetry monitor. All other vital sings stable.

## 2020-08-15 NOTE — PROVIDER CONTACT NOTE (OTHER) - ACTION/TREATMENT ORDERED:
Tele Pa LIYA SAEED notified and made aware. Pa recommended to monitor patient, no interventions at this time. will continue to monitor.

## 2020-08-15 NOTE — DISCHARGE NOTE PROVIDER - NSDCCPCAREPLAN_GEN_ALL_CORE_FT
PRINCIPAL DISCHARGE DIAGNOSIS  Diagnosis: Chest pain  Assessment and Plan of Treatment: You were having chest pain. Your EKG was normal. Your cardiac enzymes were normal. You were started on Aspirin. However this chest pain is more likely from your stomach or esophagus. You were started on Maalox and Protonix. Please take Protonix every day. Please take Maalox as needed. Please follow up with Dr. Hunt. Please follow up with Dr. Reynolds.

## 2020-08-15 NOTE — DISCHARGE NOTE PROVIDER - CARE PROVIDER_API CALL
Tu Reynolds)  Cardiology  [address] [place] [address]  Phone: [phone]  Fax: [phone]  Follow Up Time:     [facility]  Internal Medicine  [address] [place] [address]  Phone: [phone]  Fax: ()-  Follow Up Time:

## 2020-08-16 LAB
SARS-COV-2 IGG SERPL QL IA: NEGATIVE — SIGNIFICANT CHANGE UP
SARS-COV-2 IGM SERPL IA-ACNC: <0.1 INDEX — SIGNIFICANT CHANGE UP

## 2024-04-02 NOTE — ED ADULT TRIAGE NOTE - AS TEMP SITE
Please call patient to offer same day apt to discuss - I currently have apt today (2/6/24) at 5 PM.    VM from pt:    Yeah, My name's Atilio ROLLE. You guys called me yesterday saying to call you. I think about an appointment, I think, but I'm not sure. It was early in the morning and I called four or five times yesterday, called today, getting somebody to talk to, pretty rough. So you can try getting me back. I'm not a vivien who lives on the phone, so we're going to play phone tag for probably the next few weeks. Raffaele, thanks. Chad [phone].    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       oral

## 2024-05-24 NOTE — H&P ADULT - NSICDXFAMILYHX_GEN_ALL_CORE_FT
Interval History: NAEON. Pleasant, lying in bed watching TV. Denies headache. Pending MMA today. Neuro exam stable.     Medications:  Continuous Infusions:  Scheduled Meds:   bacitracin zinc   Topical (Top) BID    folic acid  1 mg Oral Daily    gabapentin  600 mg Oral TID    heparin (porcine)  5,000 Units Subcutaneous Q8H    losartan  25 mg Oral Daily    multivitamin  1 tablet Oral Daily    PHENobarbitaL  15 mg Oral BID    Followed by    [START ON 5/25/2024] PHENobarbitaL  10 mg Oral BID    Followed by    [START ON 5/26/2024] PHENobarbitaL  5 mg Oral BID    polyethylene glycol  17 g Oral Daily    QUEtiapine  25 mg Oral QHS    senna-docusate 8.6-50 mg  1 tablet Oral Daily    thiamine  100 mg Oral Daily     PRN Meds:  Current Facility-Administered Medications:     acetaminophen, 1,000 mg, Oral, Q8H PRN    hydrALAZINE, 10 mg, Intravenous, Q4H PRN    labetalol, 10 mg, Intravenous, Q6H PRN    OLANZapine, 5 mg, Intramuscular, Q8H PRN    ondansetron, 8 mg, Intravenous, Q6H PRN    oxyCODONE, 5 mg, Oral, Q4H PRN     Review of Systems  Objective:     Weight: 61.2 kg (135 lb)  Body mass index is 21.14 kg/m².  Vital Signs (Most Recent):  Temp: 98 °F (36.7 °C) (05/24/24 0746)  Pulse: 83 (05/24/24 1230)  Resp: 18 (05/24/24 0746)  BP: (!) 144/68 (05/24/24 0746)  SpO2: 97 % (05/24/24 0746) Vital Signs (24h Range):  Temp:  [97.6 °F (36.4 °C)-99.1 °F (37.3 °C)] 98 °F (36.7 °C)  Pulse:  [72-95] 83  Resp:  [16-18] 18  SpO2:  [96 %-98 %] 97 %  BP: (105-171)/(57-78) 144/68     Date 05/24/24 0700 - 05/25/24 0659   Shift 6184-1796 5453-8705 7647-8388 24 Hour Total   INTAKE   P.O. 0   0   Shift Total(mL/kg) 0(0)   0(0)   OUTPUT   Shift Total(mL/kg)       Weight (kg) 61.2 61.2 61.2 61.2                       Male External Urinary Catheter 05/19/24 1502 (Active)   Collection Container Standard drainage bag 05/23/24 0800   Securement Method secured to top of thigh w/ adhesive device 05/23/24 0800   Skin no redness;no breakdown 05/23/24 0800  "  Tolerance no signs/symptoms of discomfort 05/23/24 0800   Output (mL) 50 mL 05/22/24 0601   Catheter Change Date 05/23/24 05/23/24 0800   Catheter Change Time 1000 05/23/24 0800          Physical Exam         Neurosurgery Physical Exam    General: well developed, well nourished, no distress.   Head: normocephalic  Neurologic: Alert and oriented. Thought content appropriate.  GCS: Motor: 6/Verbal: 5/Eyes: 4 GCS Total: 15  Mental Status: Awake, Alert, Oriented x 4  Language: No aphasia  Speech: No dysarthria  Cranial nerves: face symmetric, tongue midline, CN II-XII grossly intact.   Eyes: pupils equal, round, reactive to light with accomodation, EOMI.  Pulmonary: normal respirations, no signs of respiratory distress  Sensory: intact to light touch throughout  Motor Strength: Moves all extremities spontaneously with good tone.  Full strength upper and lower extremities. No abnormal movements seen.   Pronator Drift: no drift noted  Finger-to-nose: Intact bilaterally  Skin: Skin is warm, dry and intact.  Incision c/d/I with skin edges well approximated.    Significant Labs:  Recent Labs   Lab 05/23/24  0623 05/24/24  0655   GLU 84 112*   * 133*   K 4.1 4.2    103   CO2 23 26   BUN 12 19   CREATININE 0.9 1.1   CALCIUM 8.6* 8.6*   MG 1.7 1.7     Recent Labs   Lab 05/23/24  0623 05/24/24  0655   WBC 4.29 4.60   HGB 11.1* 10.7*   HCT 34.0* 32.3*    195     No results for input(s): "LABPT", "INR", "APTT" in the last 48 hours.  Microbiology Results (last 7 days)       ** No results found for the last 168 hours. **          Recent Lab Results         05/24/24  0655        Albumin 2.5       ALP 85       ALT 12       Anion Gap 4       AST 28       Baso # 0.02       Basophil % 0.4       BILIRUBIN TOTAL 0.2  Comment: For infants and newborns, interpretation of results should be based  on gestational age, weight and in agreement with clinical  observations.    Premature Infant recommended reference ranges:  Up " to 24 hours.............<8.0 mg/dL  Up to 48 hours............<12.0 mg/dL  3-5 days..................<15.0 mg/dL  6-29 days.................<15.0 mg/dL         BUN 19       Calcium 8.6       Chloride 103       CO2 26       Creatinine 1.1       Differential Method Automated       eGFR >60.0       Eos # 0.2       Eos % 3.7       Glucose 112       Gran # (ANC) 2.4       Gran % 52.9       Hematocrit 32.3       Hemoglobin 10.7       Immature Grans (Abs) 0.01  Comment: Mild elevation in immature granulocytes is non specific and   can be seen in a variety of conditions including stress response,   acute inflammation, trauma and pregnancy. Correlation with other   laboratory and clinical findings is essential.         Immature Granulocytes 0.2       Lymph # 1.6       Lymph % 35.2       Magnesium  1.7       MCH 32.7       MCHC 33.1       MCV 99       Mono # 0.4       Mono % 7.6       MPV 10.5       nRBC 0       Phosphorus Level 3.5       Platelet Count 195       Potassium 4.2       PROTEIN TOTAL 5.7       RBC 3.27       RDW 13.4       Sodium 133       WBC 4.60             All pertinent labs from the last 24 hours have been reviewed.    Significant Diagnostics:  I have reviewed all pertinent imaging results/findings within the past 24 hours.   FAMILY HISTORY:  Father  Still living? No  Family history of lung cancer, Age at diagnosis: Age Unknown

## 2025-07-29 NOTE — H&P ADULT - PROBLEM SELECTOR PLAN 1
[Time Spent: ___ minutes] : I have spent [unfilled] minutes of time on the encounter which excludes teaching and separately reported services. Low suspicion for cardiac etiology, HsT neg x2, EKG: NSR, no ST changes.   More likely GI cause due to episode of forced emesis, follows with Dr. Hunt. Symptoms improved with GI cocktail   -NST ordered, monitor on telemetry, repeat EKG and troponin if pt reports new chest pain  -C/w Protonix, pepcid, Zofran, maalox solution.